# Patient Record
Sex: MALE | Race: WHITE | NOT HISPANIC OR LATINO | URBAN - METROPOLITAN AREA
[De-identification: names, ages, dates, MRNs, and addresses within clinical notes are randomized per-mention and may not be internally consistent; named-entity substitution may affect disease eponyms.]

---

## 2018-10-09 ENCOUNTER — OFFICE VISIT - HEALTHEAST (OUTPATIENT)
Dept: FAMILY MEDICINE | Facility: CLINIC | Age: 68
End: 2018-10-09

## 2018-10-09 DIAGNOSIS — R01.1 SYSTOLIC MURMUR: ICD-10-CM

## 2018-10-09 DIAGNOSIS — J06.9 VIRAL URI WITH COUGH: ICD-10-CM

## 2018-10-09 RX ORDER — ASPIRIN 81 MG/1
81 TABLET, CHEWABLE ORAL DAILY
Status: SHIPPED | COMMUNITY
Start: 2018-10-09

## 2018-10-09 RX ORDER — CHLORAL HYDRATE 500 MG
2 CAPSULE ORAL DAILY
Status: SHIPPED | COMMUNITY
Start: 2018-10-09

## 2018-10-09 RX ORDER — LEVOTHYROXINE SODIUM 125 UG/1
125 TABLET ORAL DAILY
Status: SHIPPED | COMMUNITY
Start: 2018-10-09

## 2018-10-09 RX ORDER — TAMSULOSIN HYDROCHLORIDE 0.4 MG/1
0.4 CAPSULE ORAL
Status: SHIPPED | COMMUNITY
Start: 2018-10-09

## 2018-10-09 RX ORDER — PREGABALIN 75 MG/1
75 CAPSULE ORAL 2 TIMES DAILY
Status: SHIPPED | COMMUNITY
Start: 2018-10-09

## 2018-10-09 ASSESSMENT — MIFFLIN-ST. JEOR: SCORE: 1711.83

## 2021-06-02 VITALS — HEIGHT: 67 IN | BODY MASS INDEX: 34.54 KG/M2 | WEIGHT: 220.06 LBS

## 2021-06-16 PROBLEM — R01.1 SYSTOLIC MURMUR: Status: ACTIVE | Noted: 2018-10-09

## 2021-06-20 NOTE — PROGRESS NOTES
Assessment/Plan:    Viral URI with cough  Symptoms consistent with viral etiology, no evidence of bacterial infection on today's exam.  Patient wondering about possibility of flu -discussed management of flu as best within 48 hours, would not recommend Tamiflu for this patient as his symptoms started 72 hours ago, recommended against flu testing today and patient agrees.  Discussed supportive cares including rest, hydration, Tylenol as needed, Mucinex, cough drops/syrup.  Encouraged patient to follow-up in clinic for new or worsening symptoms and patient voices understanding.  As patient reports possible upcoming spinal surgery, I did mention to him today that surgery would be delayed if his illness continues or worsens.    Systolic murmur  3/6 systolic murmur heard best at left sternal border on today's exam.  Patient reports no history of cardiac murmurs.  Will continue to monitor but may require further evaluation prior to patient's surgery.      Follow up: as needed for new or worsening symptoms    Jamilah Walker MD  [facility]    Subjective:    Patient ID: Doyle Jaramillo is a 68 y.o. male is here today for cold symptoms    Cold symptoms:  -started 3 days ago  -lower back pain (hx spinal stenosis, planning surgical fusion in a few weeks) on lateral sides and up ribs  -headache, bilateral temples and posterior  -chest heaviness  -fatigue  -cough, mildly productive  -fever 101.4F at home once but none recently  -no sore throat, shortness of breath, chest pain, ear sx, eye sx  -grandson was sick recently (coughing)  -took mucinex twice (12 hrs apart), possibly helpful  -had recently been taking dexamethasone (thinks for spinal stenosis) at beginning of September    Past Medical History:   Diagnosis Date     Neuropathy (H)     bilateral feet     Spinal stenosis      Past Surgical History:   Procedure Laterality Date     NERVE SURGERY      in feet for neuropathy     Medications:  Aspirin 81  "mg  Synthroid 125 mcg  Fish oil  Lyrica 75 mg  Flomax 0.4 mg    Allergies   Allergen Reactions     Niacin Rash     Social History     Social History     Marital status:      Spouse name: N/A     Number of children: N/A     Years of education: N/A     Occupational History     Not on file.     Social History Main Topics     Smoking status: Former Smoker     Start date: 1970     Quit date: 1982     Smokeless tobacco: Never Used     Alcohol use No     Drug use: No     Sexual activity: Not on file     Other Topics Concern     Not on file     Social History Narrative     No narrative on file       Review of systems is as stated in HPI, and the remainder of the 10 system review is otherwise negative.    Objective:      /60 (Patient Site: Right Arm, Patient Position: Sitting, Cuff Size: Adult Large)  Pulse 93  Temp 99.6  F (37.6  C)  Ht 5' 7\" (1.702 m)  Wt 220 lb 1 oz (99.8 kg)  SpO2 98%  BMI 34.47 kg/m2    General appearance: awake, appears fatigued but non-toxic, NAD  HEENT: atraumatic, normocephalic, PERRL, no scleral icterus or injection, TMs normal bilaterally without effusion or erythema, no erythema of oropharynx, moist mucous membranes  Neck: supple, no lymphadenopathy, normal ROM  CV: RRR, 3/6 systolic ejection murmur, normal S1 and S2  Lungs: CTAB, no wheezes or crackles, breathing comfortably on room air, cough observed  Skin: no rashes or lesions  Neuro: alert, oriented x3, CNs grossly intact, no focal deficits appreciated  Psych: normal mood/affect/behavior, answering questions appropriately    "